# Patient Record
Sex: MALE | Race: WHITE | NOT HISPANIC OR LATINO | Employment: FULL TIME | URBAN - METROPOLITAN AREA
[De-identification: names, ages, dates, MRNs, and addresses within clinical notes are randomized per-mention and may not be internally consistent; named-entity substitution may affect disease eponyms.]

---

## 2018-06-05 DIAGNOSIS — M25.532 LEFT WRIST PAIN: Primary | ICD-10-CM

## 2018-06-05 DIAGNOSIS — M79.672 LEFT FOOT PAIN: ICD-10-CM

## 2018-06-06 ENCOUNTER — OFFICE VISIT (OUTPATIENT)
Dept: ORTHOPEDICS | Facility: CLINIC | Age: 38
End: 2018-06-06
Payer: COMMERCIAL

## 2018-06-06 ENCOUNTER — HOSPITAL ENCOUNTER (OUTPATIENT)
Dept: RADIOLOGY | Facility: HOSPITAL | Age: 38
Discharge: HOME | End: 2018-06-06
Attending: ORTHOPAEDIC SURGERY
Payer: COMMERCIAL

## 2018-06-06 VITALS — HEIGHT: 70 IN | BODY MASS INDEX: 21.47 KG/M2 | WEIGHT: 150 LBS

## 2018-06-06 DIAGNOSIS — M79.672 LEFT FOOT PAIN: ICD-10-CM

## 2018-06-06 DIAGNOSIS — M25.532 LEFT WRIST PAIN: ICD-10-CM

## 2018-06-06 DIAGNOSIS — M25.572 ACUTE LEFT ANKLE PAIN: ICD-10-CM

## 2018-06-06 DIAGNOSIS — S52.502A CLOSED FRACTURE OF DISTAL END OF LEFT RADIUS, UNSPECIFIED FRACTURE MORPHOLOGY, INITIAL ENCOUNTER: ICD-10-CM

## 2018-06-06 DIAGNOSIS — M25.532 LEFT WRIST PAIN: Primary | ICD-10-CM

## 2018-06-06 PROCEDURE — 73630 X-RAY EXAM OF FOOT: CPT | Mod: TC,PO,LT

## 2018-06-06 PROCEDURE — 73610 X-RAY EXAM OF ANKLE: CPT | Mod: 26,LT,, | Performed by: RADIOLOGY

## 2018-06-06 PROCEDURE — 99203 OFFICE O/P NEW LOW 30 MIN: CPT | Mod: 57,S$GLB,, | Performed by: ORTHOPAEDIC SURGERY

## 2018-06-06 PROCEDURE — 25600 CLTX DST RDL FX/EPHYS SEP WO: CPT | Mod: LT,S$GLB,, | Performed by: ORTHOPAEDIC SURGERY

## 2018-06-06 PROCEDURE — 3008F BODY MASS INDEX DOCD: CPT | Mod: CPTII,S$GLB,, | Performed by: ORTHOPAEDIC SURGERY

## 2018-06-06 PROCEDURE — 73110 X-RAY EXAM OF WRIST: CPT | Mod: TC,PO,LT

## 2018-06-06 PROCEDURE — 99999 PR PBB SHADOW E&M-EST. PATIENT-LVL II: CPT | Mod: PBBFAC,,, | Performed by: ORTHOPAEDIC SURGERY

## 2018-06-06 PROCEDURE — 73630 X-RAY EXAM OF FOOT: CPT | Mod: 26,LT,, | Performed by: RADIOLOGY

## 2018-06-06 PROCEDURE — 73130 X-RAY EXAM OF HAND: CPT | Mod: TC,PO,LT

## 2018-06-06 PROCEDURE — 73130 X-RAY EXAM OF HAND: CPT | Mod: 26,LT,, | Performed by: RADIOLOGY

## 2018-06-06 PROCEDURE — 73110 X-RAY EXAM OF WRIST: CPT | Mod: 26,LT,, | Performed by: RADIOLOGY

## 2018-06-06 PROCEDURE — 73610 X-RAY EXAM OF ANKLE: CPT | Mod: TC,PO,LT

## 2018-06-06 RX ORDER — OXYCODONE AND ACETAMINOPHEN 5; 325 MG/1; MG/1
5-325 TABLET ORAL EVERY 8 HOURS PRN
COMMUNITY
Start: 2018-05-31 | End: 2018-06-22 | Stop reason: ALTCHOICE

## 2018-06-06 NOTE — PROGRESS NOTES
Shelley Wagner, 37-year-old about a week ago, fell off a ladder, injured his left   wrist and left foot when he fell off a ladder.  He went to an outlying facility.    He had a closed reduction, long arm cast placed into his left wrist, comes in   today for more definitive treatment.  He rates the pain as 6/10 on the pain   scale.    PHYSICAL EXAMINATION:  Today shows a well-fitting long arm cast.  Fingertips   well perfused.  No pain with passive stretch of the fingers.  Exam of the left   foot shows some bruising and swelling in the region of the mid foot dorsally.    Flexor and extensor intact.  Compartments are soft.  X-rays of the left foot   show a foreign body in the fifth metatarsal.  Previous hardware placed in ankle,   medial and lateral malleoli, total of three screws.  X-rays left wrist show a   distal radial fracture that is comminuted with good position in AP and lateral   planes.    ASSESSMENT:  Left distal radial fracture.    PLAN:  We will continue with the long arm cast.  We will have him come back in a   couple of weeks' time with repeat x-rays of his wrist in the cast.    Additionally, he has a boot that he can use for his left foot and I will see him   back to recheck that as well.      PBB/HN  dd: 06/06/2018 11:19:39 (CDT)  td: 06/06/2018 23:18:52 (CDT)  Doc ID   #[id number]  Job ID #[id number]    CC:     Further History  Aching pain  Worse with activity  Relieved with rest  No other associated symptoms  No other radiation    Further Exam  Alert and oriented  Pleasant  Contralateral limb has appropriate range of motion for age and condition  Contralateral limb has appropriate strength for age and condition  Contralateral limb has appropriate stability  for age and condition  No adenopathy  Pulses are appropriate for current condition  Skin is intact        Chief Complaint    Chief Complaint   Patient presents with    Left Ankle - Injury, Pain    Left Wrist - Injury, Pain       HPI  Shelley Wagner is a  37 y.o.  male who presents with       Past Medical History  No past medical history on file.    Past Surgical History  No past surgical history on file.    Medications  Current Outpatient Prescriptions   Medication Sig    oxyCODONE-acetaminophen (PERCOCET) 5-325 mg per tablet Take 5-325 tablets by mouth every 8 (eight) hours as needed.      No current facility-administered medications for this visit.        Allergies  Review of patient's allergies indicates:  No Known Allergies    Family History  No family history on file.    Social History  Social History     Social History    Marital status:      Spouse name: N/A    Number of children: N/A    Years of education: N/A     Occupational History    Not on file.     Social History Main Topics    Smoking status: Not on file    Smokeless tobacco: Not on file    Alcohol use Not on file    Drug use: Unknown    Sexual activity: Not on file     Other Topics Concern    Not on file     Social History Narrative    No narrative on file               Review of Systems     Constitutional: Negative    HENT: Negative  Eyes: Negative  Respiratory: Negative  Cardiovascular: Negative  Musculoskeletal: HPI  Skin: Negative  Neurological: Negative  Hematological: Negative  Endocrine: Negative                 Physical Exam    There were no vitals filed for this visit.  Body mass index is 21.52 kg/m².  Physical Examination:     General appearance -  well appearing, and in no distress  Mental status - awake  Neck - supple  Chest -  symmetric air entry  Heart - normal rate   Abdomen - soft      Assessment     1. Left wrist pain    2. Acute left ankle pain    3. Closed fracture of distal end of left radius, unspecified fracture morphology, initial encounter          Plan

## 2018-06-19 DIAGNOSIS — M25.532 LEFT WRIST PAIN: Primary | ICD-10-CM

## 2018-06-22 ENCOUNTER — HOSPITAL ENCOUNTER (OUTPATIENT)
Dept: RADIOLOGY | Facility: HOSPITAL | Age: 38
Discharge: HOME | End: 2018-06-22
Attending: ORTHOPAEDIC SURGERY
Payer: COMMERCIAL

## 2018-06-22 ENCOUNTER — OFFICE VISIT (OUTPATIENT)
Dept: ORTHOPEDICS | Facility: CLINIC | Age: 38
End: 2018-06-22
Payer: COMMERCIAL

## 2018-06-22 VITALS — HEIGHT: 70 IN | BODY MASS INDEX: 21.47 KG/M2 | WEIGHT: 150 LBS

## 2018-06-22 DIAGNOSIS — M25.532 LEFT WRIST PAIN: ICD-10-CM

## 2018-06-22 DIAGNOSIS — Z98.890 HISTORY OF OPEN REDUCTION AND INTERNAL FIXATION (ORIF) PROCEDURE: ICD-10-CM

## 2018-06-22 DIAGNOSIS — S52.502A CLOSED FRACTURE OF DISTAL END OF LEFT RADIUS, UNSPECIFIED FRACTURE MORPHOLOGY, INITIAL ENCOUNTER: Primary | ICD-10-CM

## 2018-06-22 DIAGNOSIS — M79.672 LEFT FOOT PAIN: ICD-10-CM

## 2018-06-22 PROCEDURE — 99999 PR PBB SHADOW E&M-EST. PATIENT-LVL II: CPT | Mod: PBBFAC,,, | Performed by: ORTHOPAEDIC SURGERY

## 2018-06-22 PROCEDURE — 99024 POSTOP FOLLOW-UP VISIT: CPT | Mod: S$GLB,,, | Performed by: ORTHOPAEDIC SURGERY

## 2018-06-22 PROCEDURE — 73110 X-RAY EXAM OF WRIST: CPT | Mod: TC,PO,LT

## 2018-06-22 PROCEDURE — 73110 X-RAY EXAM OF WRIST: CPT | Mod: 26,LT,, | Performed by: RADIOLOGY

## 2018-06-22 NOTE — PROGRESS NOTES
Shelley is 37 years old.  He is about a month out from his distal radial   fracture, treated with closed reduction and casting.  He comes in today.  Pain   is decreasing.    X-rays show maintenance of position of his comminuted and displaced distal   radial fracture.    PLAN:  At this point, we will remove his long arm cast and put him into a wrist   and forearm immobilizer.  We will have him come back in about a month's time as   a postoperative visit with x-rays of his left wrist.      MARVIN/GREYSON  dd: 06/22/2018 12:05:42 (CDT)  td: 06/23/2018 02:29:59 (CDT)  Doc ID   #[id number]  Job ID #[id number]    CC:

## 2018-07-17 DIAGNOSIS — M25.532 LEFT WRIST PAIN: Primary | ICD-10-CM

## 2018-07-20 ENCOUNTER — OFFICE VISIT (OUTPATIENT)
Dept: ORTHOPEDICS | Facility: CLINIC | Age: 38
End: 2018-07-20
Payer: COMMERCIAL

## 2018-07-20 ENCOUNTER — HOSPITAL ENCOUNTER (OUTPATIENT)
Dept: RADIOLOGY | Facility: HOSPITAL | Age: 38
Discharge: HOME | End: 2018-07-20
Attending: ORTHOPAEDIC SURGERY
Payer: COMMERCIAL

## 2018-07-20 VITALS — BODY MASS INDEX: 21.47 KG/M2 | HEIGHT: 70 IN | WEIGHT: 150 LBS

## 2018-07-20 DIAGNOSIS — S52.502D CLOSED FRACTURE OF DISTAL END OF LEFT RADIUS WITH ROUTINE HEALING, UNSPECIFIED FRACTURE MORPHOLOGY, SUBSEQUENT ENCOUNTER: Primary | ICD-10-CM

## 2018-07-20 DIAGNOSIS — M25.532 LEFT WRIST PAIN: ICD-10-CM

## 2018-07-20 PROCEDURE — 99999 PR PBB SHADOW E&M-EST. PATIENT-LVL II: CPT | Mod: PBBFAC,,, | Performed by: ORTHOPAEDIC SURGERY

## 2018-07-20 PROCEDURE — 99024 POSTOP FOLLOW-UP VISIT: CPT | Mod: S$GLB,,, | Performed by: ORTHOPAEDIC SURGERY

## 2018-07-20 PROCEDURE — 73110 X-RAY EXAM OF WRIST: CPT | Mod: TC,PO,LT

## 2018-07-20 PROCEDURE — 73110 X-RAY EXAM OF WRIST: CPT | Mod: 26,LT,, | Performed by: RADIOLOGY

## 2018-07-20 NOTE — PROGRESS NOTES
Mr. Wagner is about two months out from his distal radial fracture, treated with   closed reduction and casting, which was done elsewhere.  He has some discomfort   in his wrist, still lacks full motion.  Still a little bit tender throughout.    No signs of infection.  Flexion and extension intact.  Compartments are soft.    X-rays show a healing distal radial fracture with possible disruption of the   DRUJ as well.    ASSESSMENT:  Wrist fracture.    PLAN:  We are going to wean the use of the wrist and forearm immobilizer.  Work   on gentle range of motion, light activity, light strengthening.  Follow up in   about six weeks' time with repeat x-rays of his wrist.      MARVIN/GREYSON  dd: 07/20/2018 11:37:29 (CDT)  td: 07/21/2018 05:05:18 (CDT)  Doc ID   #[id number]  Job ID #[id number]    CC:

## 2018-08-28 DIAGNOSIS — S52.502S CLOSED FRACTURE OF DISTAL END OF LEFT RADIUS, UNSPECIFIED FRACTURE MORPHOLOGY, SEQUELA: Primary | ICD-10-CM

## 2018-08-31 ENCOUNTER — HOSPITAL ENCOUNTER (OUTPATIENT)
Dept: RADIOLOGY | Facility: HOSPITAL | Age: 38
Discharge: HOME | End: 2018-08-31
Attending: ORTHOPAEDIC SURGERY
Payer: COMMERCIAL

## 2018-08-31 ENCOUNTER — OFFICE VISIT (OUTPATIENT)
Dept: ORTHOPEDICS | Facility: CLINIC | Age: 38
End: 2018-08-31
Payer: COMMERCIAL

## 2018-08-31 VITALS — HEIGHT: 70 IN | WEIGHT: 150 LBS | BODY MASS INDEX: 21.47 KG/M2

## 2018-08-31 DIAGNOSIS — S52.502S CLOSED FRACTURE OF DISTAL END OF LEFT RADIUS, UNSPECIFIED FRACTURE MORPHOLOGY, SEQUELA: Primary | ICD-10-CM

## 2018-08-31 DIAGNOSIS — S52.502S CLOSED FRACTURE OF DISTAL END OF LEFT RADIUS, UNSPECIFIED FRACTURE MORPHOLOGY, SEQUELA: ICD-10-CM

## 2018-08-31 DIAGNOSIS — S93.409S SPRAIN OF ANKLE, UNSPECIFIED LATERALITY, UNSPECIFIED LIGAMENT, SEQUELA: ICD-10-CM

## 2018-08-31 PROCEDURE — 73110 X-RAY EXAM OF WRIST: CPT | Mod: 26,LT,, | Performed by: RADIOLOGY

## 2018-08-31 PROCEDURE — 3008F BODY MASS INDEX DOCD: CPT | Mod: CPTII,S$GLB,, | Performed by: ORTHOPAEDIC SURGERY

## 2018-08-31 PROCEDURE — 99024 POSTOP FOLLOW-UP VISIT: CPT | Mod: S$GLB,,, | Performed by: ORTHOPAEDIC SURGERY

## 2018-08-31 PROCEDURE — 99999 PR PBB SHADOW E&M-EST. PATIENT-LVL III: CPT | Mod: PBBFAC,,, | Performed by: ORTHOPAEDIC SURGERY

## 2018-08-31 PROCEDURE — 73110 X-RAY EXAM OF WRIST: CPT | Mod: TC,PO,LT

## 2018-08-31 NOTE — PROGRESS NOTES
38 years old.  He is now over three months out from his distal radial fracture.    He feels like he is improving.  His x-rays show good healing of the bone with   persistent malunion and DRUJ disruption.    PLAN:  At this point, we are going to get him set up with therapy to work on   regaining motion and function.  He is aware of the fact that he will probably   have limitations with this injury, but hopefully be able to function at a high   level.  We will check him back here in several weeks' time after therapy and see   how things are coming along.      MARVIN/GREYSON  dd: 08/31/2018 12:30:53 (CDT)  td: 09/01/2018 07:06:04 (CDT)  Doc ID   #[id number]  Job ID #[id number]    CC:

## 2018-09-10 ENCOUNTER — TELEPHONE (OUTPATIENT)
Dept: ORTHOPEDICS | Facility: CLINIC | Age: 38
End: 2018-09-10

## 2018-09-10 NOTE — TELEPHONE ENCOUNTER
----- Message from Carlito Sloan sent at 9/10/2018  2:45 PM CDT -----  Type: Needs Medical Advice    Who Called:  Bernardo/Basil SIMMS  Best Call Back Number: [phone]  Additional Information: Need OP Report

## 2018-10-19 ENCOUNTER — OFFICE VISIT (OUTPATIENT)
Dept: ORTHOPEDICS | Facility: CLINIC | Age: 38
End: 2018-10-19
Payer: COMMERCIAL

## 2018-10-19 VITALS — HEIGHT: 70 IN | WEIGHT: 150 LBS | BODY MASS INDEX: 21.47 KG/M2

## 2018-10-19 DIAGNOSIS — M25.532 LEFT WRIST PAIN: ICD-10-CM
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
## 2018-10-19 DIAGNOSIS — S69.92XD LEFT WRIST INJURY, SUBSEQUENT ENCOUNTER: ICD-10-CM

## 2018-10-19 DIAGNOSIS — S52.502D CLOSED FRACTURE OF DISTAL END OF LEFT RADIUS WITH ROUTINE HEALING, UNSPECIFIED FRACTURE MORPHOLOGY, SUBSEQUENT ENCOUNTER: Primary | ICD-10-CM

## 2018-10-19 PROCEDURE — 3008F BODY MASS INDEX DOCD: CPT | Mod: CPTII,S$GLB,, | Performed by: ORTHOPAEDIC SURGERY

## 2018-10-19 PROCEDURE — 99213 OFFICE O/P EST LOW 20 MIN: CPT | Mod: S$GLB,,, | Performed by: ORTHOPAEDIC SURGERY

## 2018-10-19 PROCEDURE — 99999 PR PBB SHADOW E&M-EST. PATIENT-LVL II: CPT | Mod: PBBFAC,,, | Performed by: ORTHOPAEDIC SURGERY

## 2018-10-19 NOTE — PROGRESS NOTES
This is a 38 years old, about five months out from his injury, initially treated   elsewhere with closed reduction.  He has malunion and ____ symptomatic for him.    He is interested in discussing different options for his injury with a   specialist.  We will see if he can get him again point in that direction.      MARVIN/GREYSON  dd: 10/19/2018 11:30:25 (CDT)  td: 10/20/2018 01:33:26 (CDT)  Doc ID   #[id number]  Job ID #[id number]    CC:

## 2018-10-22 DIAGNOSIS — M25.572 LEFT ANKLE PAIN, UNSPECIFIED CHRONICITY: Primary | ICD-10-CM

## 2018-10-23 ENCOUNTER — OFFICE VISIT (OUTPATIENT)
Dept: ORTHOPEDICS | Facility: CLINIC | Age: 38
End: 2018-10-23
Payer: COMMERCIAL

## 2018-10-23 ENCOUNTER — HOSPITAL ENCOUNTER (OUTPATIENT)
Dept: RADIOLOGY | Facility: HOSPITAL | Age: 38
Discharge: HOME | End: 2018-10-23
Attending: ORTHOPAEDIC SURGERY
Payer: COMMERCIAL

## 2018-10-23 VITALS
DIASTOLIC BLOOD PRESSURE: 86 MMHG | HEART RATE: 62 BPM | WEIGHT: 150 LBS | BODY MASS INDEX: 21.47 KG/M2 | SYSTOLIC BLOOD PRESSURE: 156 MMHG | HEIGHT: 70 IN

## 2018-10-23 DIAGNOSIS — M25.572 LEFT ANKLE PAIN, UNSPECIFIED CHRONICITY: ICD-10-CM

## 2018-10-23 DIAGNOSIS — M25.572 LEFT ANKLE PAIN, UNSPECIFIED CHRONICITY: Primary | ICD-10-CM

## 2018-10-23 DIAGNOSIS — M76.822 POSTERIOR TIBIAL TENDON DYSFUNCTION (PTTD) OF LEFT LOWER EXTREMITY: ICD-10-CM

## 2018-10-23 PROCEDURE — 99244 OFF/OP CNSLTJ NEW/EST MOD 40: CPT | Mod: S$GLB,,, | Performed by: ORTHOPAEDIC SURGERY

## 2018-10-23 PROCEDURE — 73610 X-RAY EXAM OF ANKLE: CPT | Mod: 26,LT,, | Performed by: RADIOLOGY

## 2018-10-23 PROCEDURE — 99999 PR PBB SHADOW E&M-EST. PATIENT-LVL III: CPT | Mod: PBBFAC,,, | Performed by: ORTHOPAEDIC SURGERY

## 2018-10-23 PROCEDURE — 73610 X-RAY EXAM OF ANKLE: CPT | Mod: TC,PO,LT

## 2018-10-23 NOTE — LETTER
October 23, 2018      Cliff Mack MD  [address] [facility] [address] [place] [address]           [facility]  [address] [facility] [address] [place] [address]  Phone: [phone]          Patient: Shelley Wagner   MR Number: [id number]   YOB: 1980   Date of Visit: 10/23/2018       Dear Dr. Cliff Mack:    Thank you for referring Shelley Wagner to me for evaluation. Attached you will find relevant portions of my assessment and plan of care.    If you have questions, please do not hesitate to call me. I look forward to following Shelley Wagner along with you.    Sincerely,    Tino Zarco MD    Enclosure  CC:  No Recipients    If you would like to receive this communication electronically, please contact [email or web address] or [phone] to request more information on [facility] access.    For providers and/or their staff who would like to refer a patient to [facility], please contact us through our one-stop-shop provider referral line, [facility] , at [phone].    If you feel you have received this communication in error or would no longer like to receive these types of communications, please e-mail [email or web address]
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
## 2018-10-30 ENCOUNTER — HOSPITAL ENCOUNTER (OUTPATIENT)
Dept: RADIOLOGY | Facility: HOSPITAL | Age: 38
Discharge: HOME | End: 2018-10-30
Attending: ORTHOPAEDIC SURGERY
Payer: COMMERCIAL

## 2018-10-30 DIAGNOSIS — M25.572 LEFT ANKLE PAIN, UNSPECIFIED CHRONICITY: ICD-10-CM

## 2018-10-30 PROCEDURE — 73721 MRI JNT OF LWR EXTRE W/O DYE: CPT | Mod: 26,LT,, | Performed by: RADIOLOGY

## 2018-10-30 PROCEDURE — 73721 MRI JNT OF LWR EXTRE W/O DYE: CPT | Mod: TC,LT

## 2018-10-31 ENCOUNTER — TELEPHONE (OUTPATIENT)
Dept: ORTHOPEDICS | Facility: CLINIC | Age: 38
End: 2018-10-31

## 2018-11-02 ENCOUNTER — TELEPHONE (OUTPATIENT)
Dept: ORTHOPEDICS | Facility: CLINIC | Age: 38
End: 2018-11-02

## 2018-11-02 NOTE — TELEPHONE ENCOUNTER
CALLED PATIENT TO RESCHEDULE APPOINTMENT  WILL NOT BE IN DUE TO ILLNESS PATIENT PHONE WAS UNAVAILABLE COULD NOT LEAVE MESSAGE

## 2018-11-02 NOTE — TELEPHONE ENCOUNTER
SPOKE TO PATIENT AND RESCHEDULED HIS APPOINTMENT UNTIL Monday, I ALSO LET PATIENT KNOW HE SHOULD REMAIN NON WEIGHT BEARING UNTIL THEN DUE TO HIS FRACTURE. PATIENT VERBALIZES UNDERSTANDING

## 2018-11-02 NOTE — TELEPHONE ENCOUNTER
Lm for patient's wife as his number is unavailable. Will need to speak to patient regarding test results and to reschedule his appt.

## 2018-11-05 ENCOUNTER — OFFICE VISIT (OUTPATIENT)
Dept: ORTHOPEDICS | Facility: CLINIC | Age: 38
End: 2018-11-05
Payer: COMMERCIAL

## 2018-11-05 VITALS
DIASTOLIC BLOOD PRESSURE: 99 MMHG | HEIGHT: 70 IN | WEIGHT: 149.94 LBS | BODY MASS INDEX: 21.47 KG/M2 | SYSTOLIC BLOOD PRESSURE: 145 MMHG | HEART RATE: 63 BPM

## 2018-11-05 DIAGNOSIS — S92.015G: ICD-10-CM

## 2018-11-05 DIAGNOSIS — M76.822 POSTERIOR TIBIAL TENDON DYSFUNCTION (PTTD) OF LEFT LOWER EXTREMITY: Primary | ICD-10-CM

## 2018-11-05 DIAGNOSIS — Z72.0 SMOKING TRYING TO QUIT: Primary | ICD-10-CM

## 2018-11-05 PROCEDURE — 3008F BODY MASS INDEX DOCD: CPT | Mod: CPTII,S$GLB,, | Performed by: ORTHOPAEDIC SURGERY

## 2018-11-05 PROCEDURE — 29405 APPL SHORT LEG CAST: CPT | Mod: LT,S$GLB,, | Performed by: ORTHOPAEDIC SURGERY

## 2018-11-05 PROCEDURE — 99214 OFFICE O/P EST MOD 30 MIN: CPT | Mod: 25,S$GLB,, | Performed by: ORTHOPAEDIC SURGERY

## 2018-11-05 PROCEDURE — 99999 PR PBB SHADOW E&M-EST. PATIENT-LVL III: CPT | Mod: PBBFAC,,, | Performed by: ORTHOPAEDIC SURGERY

## 2018-11-05 PROCEDURE — 99406 BEHAV CHNG SMOKING 3-10 MIN: CPT | Mod: S$GLB,,, | Performed by: ORTHOPAEDIC SURGERY

## 2018-11-05 RX ORDER — VARENICLINE TARTRATE 0.5 MG/1
0.5 TABLET, FILM COATED ORAL 2 TIMES DAILY
Qty: 60 TABLET | Refills: 0 | Status: SHIPPED | OUTPATIENT
Start: 2018-11-05 | End: 2018-12-05

## 2018-11-05 RX ORDER — METHADONE HYDROCHLORIDE 40 MG/1
90 TABLET ORAL DAILY
COMMUNITY

## 2018-11-05 RX ORDER — VARENICLINE TARTRATE 0.5 (11)-1
KIT ORAL
Qty: 1 PACKAGE | Refills: 0 | Status: SHIPPED | OUTPATIENT
Start: 2018-11-05 | End: 2019-06-13

## 2018-11-05 RX ORDER — MELOXICAM 15 MG/1
15 TABLET ORAL DAILY
COMMUNITY
Start: 2018-11-02 | End: 2018-12-21
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
## 2018-11-14 ENCOUNTER — TELEPHONE (OUTPATIENT)
Dept: ORTHOPEDICS | Facility: CLINIC | Age: 38
End: 2018-11-14

## 2018-11-28 DIAGNOSIS — S92.015G: Primary | ICD-10-CM

## 2018-11-30 ENCOUNTER — OFFICE VISIT (OUTPATIENT)
Dept: ORTHOPEDICS | Facility: CLINIC | Age: 38
End: 2018-11-30
Payer: COMMERCIAL

## 2018-11-30 ENCOUNTER — HOSPITAL ENCOUNTER (OUTPATIENT)
Dept: RADIOLOGY | Facility: HOSPITAL | Age: 38
Discharge: HOME | End: 2018-11-30
Attending: ORTHOPAEDIC SURGERY
Payer: COMMERCIAL

## 2018-11-30 VITALS
WEIGHT: 149.94 LBS | SYSTOLIC BLOOD PRESSURE: 141 MMHG | BODY MASS INDEX: 21.47 KG/M2 | HEIGHT: 70 IN | HEART RATE: 91 BPM | DIASTOLIC BLOOD PRESSURE: 97 MMHG

## 2018-11-30 DIAGNOSIS — S92.015G: ICD-10-CM

## 2018-11-30 DIAGNOSIS — S92.015K: Primary | ICD-10-CM

## 2018-11-30 PROCEDURE — 99999 PR PBB SHADOW E&M-EST. PATIENT-LVL III: CPT | Mod: PBBFAC,,, | Performed by: ORTHOPAEDIC SURGERY

## 2018-11-30 PROCEDURE — 73650 X-RAY EXAM OF HEEL: CPT | Mod: 26,LT,, | Performed by: RADIOLOGY

## 2018-11-30 PROCEDURE — 29405 APPL SHORT LEG CAST: CPT | Mod: LT,S$GLB,, | Performed by: ORTHOPAEDIC SURGERY

## 2018-11-30 PROCEDURE — 99214 OFFICE O/P EST MOD 30 MIN: CPT | Mod: 25,S$GLB,, | Performed by: ORTHOPAEDIC SURGERY

## 2018-11-30 PROCEDURE — 3008F BODY MASS INDEX DOCD: CPT | Mod: CPTII,S$GLB,, | Performed by: ORTHOPAEDIC SURGERY

## 2018-11-30 PROCEDURE — 73650 X-RAY EXAM OF HEEL: CPT | Mod: TC,PO,LT

## 2018-12-04 PROBLEM — S92.015K: Status: ACTIVE | Noted: 2018-11-05

## 2018-12-04 NOTE — PROGRESS NOTES
"HPI: Shelley Wagner is a  38 y.o. male who is here for f/u on his left calcaneus fracture. The pain began over 4 months ago when fell onto that foot.  He rates his pain as 2/10 today. He says he has cut back to 4 cigarettes a day.     PAST MEDICAL/SURGICAL/FAMILY/SOCIAL/ HISTORY: REVIEWED  + smokes 1 ppd of cigarettes    ALLERGIES/MEDICATIONS: REVIEWED     PHYSICAL EXAM:  Vitals:    11/30/18 1011   BP: (!) 141/97   Pulse: 91     Ht Readings from Last 1 Encounters:   11/30/18 5' 10" (1.778 m)     Wt Readings from Last 1 Encounters:   11/30/18 68 kg (149 lb 14.6 oz)       GENERAL: Well developed, well nourished, no acute distress.    Left lower extremity:  2+ dorsalis pedis pulse.  Capillary refill < 3 seconds.  Decreased range of motion tibiotalar and subtalar joints. Mild swelling, no ecchymosis or deformity. No lymphadenopathy, no masses or tumors palpated  tenderness to palpation body of the calcaneus.       XRAYS: 2 views of left calcaneus obtained and reviewed today reveal there is vague curvilinear sclerosis present within the left calcaneus compatible with a non-union of the fracture of the left calcaneus.     ASSESSMENT:           Encounter Diagnosis   Name Primary?   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 Non-weightbearing.  F/u 3 weeks with xray of the left calcaneus.      "

## 2018-12-19 DIAGNOSIS — M79.672 LEFT FOOT PAIN: Primary | ICD-10-CM

## 2018-12-21 ENCOUNTER — OFFICE VISIT (OUTPATIENT)
Dept: ORTHOPEDICS | Facility: CLINIC | Age: 38
End: 2018-12-21
Payer: COMMERCIAL

## 2018-12-21 ENCOUNTER — HOSPITAL ENCOUNTER (OUTPATIENT)
Dept: RADIOLOGY | Facility: HOSPITAL | Age: 38
Discharge: HOME | End: 2018-12-21
Attending: ORTHOPAEDIC SURGERY
Payer: COMMERCIAL

## 2018-12-21 VITALS
WEIGHT: 149.94 LBS | HEART RATE: 87 BPM | BODY MASS INDEX: 21.47 KG/M2 | DIASTOLIC BLOOD PRESSURE: 97 MMHG | HEIGHT: 70 IN | SYSTOLIC BLOOD PRESSURE: 134 MMHG

## 2018-12-21 DIAGNOSIS — S92.015K: Primary | ICD-10-CM

## 2018-12-21 DIAGNOSIS — M79.672 LEFT FOOT PAIN: ICD-10-CM

## 2018-12-21 PROCEDURE — 73650 X-RAY EXAM OF HEEL: CPT | Mod: TC,PO,LT

## 2018-12-21 PROCEDURE — 99024 POSTOP FOLLOW-UP VISIT: CPT | Mod: S$GLB,,, | Performed by: ORTHOPAEDIC SURGERY

## 2018-12-21 PROCEDURE — 99999 PR PBB SHADOW E&M-EST. PATIENT-LVL III: CPT | Mod: PBBFAC,,, | Performed by: ORTHOPAEDIC SURGERY

## 2018-12-21 PROCEDURE — 73650 X-RAY EXAM OF HEEL: CPT | Mod: 26,LT,, | Performed by: RADIOLOGY

## 2018-12-21 NOTE — PROGRESS NOTES
"HPI: Shelley Wagner is a  38 y.o. male who is here for f/u on his left calcaneus fracture.  He rates his pain as 3/10 today. He says he is still smoking 5 cigarettes a day.     PAST MEDICAL/SURGICAL/FAMILY/SOCIAL/ HISTORY: REVIEWED  + smokes 1 ppd of cigarettes    ALLERGIES/MEDICATIONS: REVIEWED     PHYSICAL EXAM:  Vitals:    12/21/18 1034   BP: (!) 134/97   Pulse: 87     Ht Readings from Last 1 Encounters:   12/21/18 5' 10" (1.778 m)     Wt Readings from Last 1 Encounters:   12/21/18 68 kg (149 lb 14.6 oz)       GENERAL: Well developed, well nourished, no acute distress.    Left lower extremity:  2+ dorsalis pedis pulse.  Capillary refill < 3 seconds.  Decreased range of motion tibiotalar and subtalar joints. Minimal swelling. Minimal tenderness to palpation body of the calcaneus.       XRAYS: 2 views of left calcaneus obtained and reviewed today reveal there is vague curvilinear sclerosis present within the left calcaneus compatible with a non-union of the fracture of the left calcaneus.     ASSESSMENT:           Encounter Diagnosis   Name Primary?   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 The patient demonstrated understanding and proper care. This was performed for 15 minutes.  Short boot  was given. Continue bone stimulator. Weight bearing as tolerated.      F/u 3 weeks with xray of the left calcaneus.      "

## 2019-01-14 ENCOUNTER — OFFICE VISIT (OUTPATIENT)
Dept: ORTHOPEDICS | Facility: CLINIC | Age: 39
End: 2019-01-14
Payer: COMMERCIAL

## 2019-01-14 ENCOUNTER — HOSPITAL ENCOUNTER (OUTPATIENT)
Dept: RADIOLOGY | Facility: HOSPITAL | Age: 39
Discharge: HOME | End: 2019-01-14
Attending: ORTHOPAEDIC SURGERY
Payer: COMMERCIAL

## 2019-01-14 VITALS
HEART RATE: 97 BPM | HEIGHT: 70 IN | SYSTOLIC BLOOD PRESSURE: 138 MMHG | DIASTOLIC BLOOD PRESSURE: 93 MMHG | BODY MASS INDEX: 21.47 KG/M2 | WEIGHT: 149.94 LBS

## 2019-01-14 DIAGNOSIS — S92.015K: ICD-10-CM

## 2019-01-14 DIAGNOSIS — S92.015K: Primary | ICD-10-CM

## 2019-01-14 PROCEDURE — 73650 X-RAY EXAM OF HEEL: CPT | Mod: 26,LT,, | Performed by: RADIOLOGY

## 2019-01-14 PROCEDURE — 3008F PR BODY MASS INDEX (BMI) DOCUMENTED: ICD-10-PCS | Mod: CPTII,S$GLB,, | Performed by: ORTHOPAEDIC SURGERY

## 2019-01-14 PROCEDURE — 73650 XR CALCANEUS 2 VIEW LEFT: ICD-10-PCS | Mod: 26,LT,, | Performed by: RADIOLOGY

## 2019-01-14 PROCEDURE — 99999 PR PBB SHADOW E&M-EST. PATIENT-LVL III: CPT | Mod: PBBFAC,,, | Performed by: ORTHOPAEDIC SURGERY

## 2019-01-14 PROCEDURE — 73650 X-RAY EXAM OF HEEL: CPT | Mod: TC,PO,LT

## 2019-01-14 PROCEDURE — 99214 OFFICE O/P EST MOD 30 MIN: CPT | Mod: S$GLB,,, | Performed by: ORTHOPAEDIC SURGERY

## 2019-01-14 PROCEDURE — 99999 PR PBB SHADOW E&M-EST. PATIENT-LVL III: ICD-10-PCS | Mod: PBBFAC,,, | Performed by: ORTHOPAEDIC SURGERY

## 2019-01-14 PROCEDURE — 3008F BODY MASS INDEX DOCD: CPT | Mod: CPTII,S$GLB,, | Performed by: ORTHOPAEDIC SURGERY

## 2019-01-14 PROCEDURE — 99214 PR OFFICE/OUTPT VISIT, EST, LEVL IV, 30-39 MIN: ICD-10-PCS | Mod: S$GLB,,, | Performed by: ORTHOPAEDIC SURGERY

## 2019-02-04 ENCOUNTER — HOSPITAL ENCOUNTER (OUTPATIENT)
Dept: RADIOLOGY | Facility: HOSPITAL | Age: 39
Discharge: HOME | End: 2019-02-04
Attending: ORTHOPAEDIC SURGERY
Payer: COMMERCIAL

## 2019-02-04 ENCOUNTER — OFFICE VISIT (OUTPATIENT)
Dept: ORTHOPEDICS | Facility: CLINIC | Age: 39
End: 2019-02-04
Payer: COMMERCIAL

## 2019-02-04 VITALS
HEIGHT: 70 IN | BODY MASS INDEX: 21.47 KG/M2 | SYSTOLIC BLOOD PRESSURE: 134 MMHG | DIASTOLIC BLOOD PRESSURE: 95 MMHG | HEART RATE: 88 BPM | WEIGHT: 149.94 LBS

## 2019-02-04 DIAGNOSIS — S92.015K: ICD-10-CM

## 2019-02-04 DIAGNOSIS — S92.015K: Primary | ICD-10-CM

## 2019-02-04 PROCEDURE — 99999 PR PBB SHADOW E&M-EST. PATIENT-LVL III: CPT | Mod: PBBFAC,,, | Performed by: ORTHOPAEDIC SURGERY

## 2019-02-04 PROCEDURE — 73650 XR CALCANEUS 2 VIEW LEFT: ICD-10-PCS | Mod: 26,LT,, | Performed by: RADIOLOGY

## 2019-02-04 PROCEDURE — 3008F BODY MASS INDEX DOCD: CPT | Mod: CPTII,S$GLB,, | Performed by: ORTHOPAEDIC SURGERY

## 2019-02-04 PROCEDURE — 99999 PR PBB SHADOW E&M-EST. PATIENT-LVL III: ICD-10-PCS | Mod: PBBFAC,,, | Performed by: ORTHOPAEDIC SURGERY

## 2019-02-04 PROCEDURE — 73650 X-RAY EXAM OF HEEL: CPT | Mod: 26,LT,, | Performed by: RADIOLOGY

## 2019-02-04 PROCEDURE — 73650 X-RAY EXAM OF HEEL: CPT | Mod: TC,PO,LT

## 2019-02-04 PROCEDURE — 99214 PR OFFICE/OUTPT VISIT, EST, LEVL IV, 30-39 MIN: ICD-10-PCS | Mod: S$GLB,,, | Performed by: ORTHOPAEDIC SURGERY

## 2019-02-04 PROCEDURE — 99214 OFFICE O/P EST MOD 30 MIN: CPT | Mod: S$GLB,,, | Performed by: ORTHOPAEDIC SURGERY

## 2019-02-04 PROCEDURE — 3008F PR BODY MASS INDEX (BMI) DOCUMENTED: ICD-10-PCS | Mod: CPTII,S$GLB,, | Performed by: ORTHOPAEDIC SURGERY

## 2019-02-04 NOTE — PROGRESS NOTES
"HPI: Shelley Wagner is a  38 y.o. male who is here for f/u on his left calcaneus fracture.  He rates his pain as 3/10 today. He did not start PT due to family issues. He is still in the boot.     PHYSICAL EXAM:  Vitals:    02/04/19 1355   BP: (!) 134/95   Pulse: 88     Ht Readings from Last 1 Encounters:   02/04/19 5' 10" (1.778 m)     Wt Readings from Last 1 Encounters:   02/04/19 68 kg (149 lb 14.6 oz)       GENERAL: Well developed, well nourished, no acute distress.    Left lower extremity:  2+ dorsalis pedis pulse.  Capillary refill < 3 seconds.  Decreased range of motion tibiotalar and subtalar joints. Minimal swelling. Minimal tenderness to palpation body of the calcaneus.       XRAYS: 2 views of left calcaneus obtained and reviewed today reveal there is vague curvilinear sclerosis . There is interval progression of healing.     ASSESSMENT:       Left calcaneus fracture    PLAN:   I re-ordered his PT 2/6.  I recommend Weight bearing as tolerated in a tennis shoe. F/u 6 weeks with xray of the left calcaneus.      "

## 2019-03-15 DIAGNOSIS — S92.015K: Primary | ICD-10-CM

## 2019-03-18 ENCOUNTER — OFFICE VISIT (OUTPATIENT)
Dept: ORTHOPEDICS | Facility: CLINIC | Age: 39
End: 2019-03-18
Payer: COMMERCIAL

## 2019-03-18 ENCOUNTER — HOSPITAL ENCOUNTER (OUTPATIENT)
Dept: RADIOLOGY | Facility: HOSPITAL | Age: 39
Discharge: HOME | End: 2019-03-18
Attending: ORTHOPAEDIC SURGERY
Payer: COMMERCIAL

## 2019-03-18 VITALS
DIASTOLIC BLOOD PRESSURE: 82 MMHG | WEIGHT: 149 LBS | HEIGHT: 70 IN | SYSTOLIC BLOOD PRESSURE: 129 MMHG | BODY MASS INDEX: 21.33 KG/M2 | HEART RATE: 60 BPM

## 2019-03-18 DIAGNOSIS — S92.015K: ICD-10-CM

## 2019-03-18 DIAGNOSIS — S92.015K: Primary | ICD-10-CM

## 2019-03-18 PROBLEM — M76.822 POSTERIOR TIBIAL TENDON DYSFUNCTION (PTTD) OF LEFT LOWER EXTREMITY: Status: RESOLVED | Noted: 2018-10-23 | Resolved: 2019-03-18

## 2019-03-18 PROCEDURE — 73650 X-RAY EXAM OF HEEL: CPT | Mod: TC,PO,LT

## 2019-03-18 PROCEDURE — 99999 PR PBB SHADOW E&M-EST. PATIENT-LVL III: ICD-10-PCS | Mod: PBBFAC,,, | Performed by: ORTHOPAEDIC SURGERY

## 2019-03-18 PROCEDURE — 73650 XR CALCANEUS 2 VIEW LEFT: ICD-10-PCS | Mod: 26,LT,, | Performed by: RADIOLOGY

## 2019-03-18 PROCEDURE — 99213 PR OFFICE/OUTPT VISIT, EST, LEVL III, 20-29 MIN: ICD-10-PCS | Mod: S$GLB,,, | Performed by: ORTHOPAEDIC SURGERY

## 2019-03-18 PROCEDURE — 99999 PR PBB SHADOW E&M-EST. PATIENT-LVL III: CPT | Mod: PBBFAC,,, | Performed by: ORTHOPAEDIC SURGERY

## 2019-03-18 PROCEDURE — 3008F PR BODY MASS INDEX (BMI) DOCUMENTED: ICD-10-PCS | Mod: CPTII,S$GLB,, | Performed by: ORTHOPAEDIC SURGERY

## 2019-03-18 PROCEDURE — 3008F BODY MASS INDEX DOCD: CPT | Mod: CPTII,S$GLB,, | Performed by: ORTHOPAEDIC SURGERY

## 2019-03-18 PROCEDURE — 99213 OFFICE O/P EST LOW 20 MIN: CPT | Mod: S$GLB,,, | Performed by: ORTHOPAEDIC SURGERY

## 2019-03-18 PROCEDURE — 73650 X-RAY EXAM OF HEEL: CPT | Mod: 26,LT,, | Performed by: RADIOLOGY

## 2019-03-18 NOTE — PROGRESS NOTES
"HPI: Shelley Wagner is a  38 y.o. male who is here for f/u on his left calcaneus fracture.  He rates his pain as 4/10 today. He did not start PT but he did go back to work and he says it is much improved.   PHYSICAL EXAM:  Vitals:    03/18/19 1355   BP: 129/82   Pulse: 60     Ht Readings from Last 1 Encounters:   03/18/19 5' 10" (1.778 m)     Wt Readings from Last 1 Encounters:   03/18/19 67.6 kg (149 lb)       GENERAL: Well developed, well nourished, no acute distress.    Left lower extremity:  2+ dorsalis pedis pulse.  Capillary refill < 3 seconds.  Decreased range of motion subtalar joint. Minimal swelling. No tenderness to palpation body of the calcaneus.       XRAYS: 2 views of left calcaneus obtained and reviewed today reveal  There is interval progression of healing.     ASSESSMENT:       Left calcaneus fracture    PLAN:   F/u prn.      "

## 2019-03-25 ENCOUNTER — TELEPHONE (OUTPATIENT)
Dept: ORTHOPEDICS | Facility: CLINIC | Age: 39
End: 2019-03-25

## 2019-06-13 ENCOUNTER — OFFICE VISIT (OUTPATIENT)
Dept: FAMILY MEDICINE | Facility: CLINIC | Age: 39
End: 2019-06-13
Payer: COMMERCIAL

## 2019-06-13 VITALS
BODY MASS INDEX: 20.36 KG/M2 | OXYGEN SATURATION: 98 % | SYSTOLIC BLOOD PRESSURE: 120 MMHG | TEMPERATURE: 98 F | HEIGHT: 70 IN | DIASTOLIC BLOOD PRESSURE: 80 MMHG | RESPIRATION RATE: 16 BRPM | HEART RATE: 56 BPM | WEIGHT: 142.19 LBS

## 2019-06-13 DIAGNOSIS — Z13.6 ENCOUNTER FOR LIPID SCREENING FOR CARDIOVASCULAR DISEASE: ICD-10-CM

## 2019-06-13 DIAGNOSIS — M25.532 LEFT WRIST PAIN: ICD-10-CM

## 2019-06-13 DIAGNOSIS — Z13.220 ENCOUNTER FOR LIPID SCREENING FOR CARDIOVASCULAR DISEASE: ICD-10-CM

## 2019-06-13 DIAGNOSIS — R74.01 TRANSAMINITIS: ICD-10-CM

## 2019-06-13 DIAGNOSIS — R11.0 NAUSEA: Primary | ICD-10-CM

## 2019-06-13 DIAGNOSIS — Z13.1 DIABETES MELLITUS SCREENING: ICD-10-CM

## 2019-06-13 LAB
ALBUMIN SERPL-MCNC: 4.3 G/DL (ref 3.1–4.7)
ALP SERPL-CCNC: 65 IU/L (ref 40–104)
ALT (SGPT): 19 IU/L (ref 3–33)
AST SERPL-CCNC: 24 IU/L (ref 10–40)
BILIRUB SERPL-MCNC: 0.8 MG/DL (ref 0.3–1)
BUN SERPL-MCNC: 14 MG/DL (ref 8–20)
CALCIUM SERPL-MCNC: 9.5 MG/DL (ref 7.7–10.4)
CHLORIDE: 105 MMOL/L (ref 98–110)
CO2 SERPL-SCNC: 30 MMOL/L (ref 22.8–31.6)
CREATININE: 1 MG/DL (ref 0.6–1.4)
GLUCOSE: 103 MG/DL (ref 70–99)
POTASSIUM SERPL-SCNC: 4.4 MMOL/L (ref 3.5–5)
PROT SERPL-MCNC: 7 G/DL (ref 6–8.2)
SODIUM: 140 MMOL/L (ref 134–144)

## 2019-06-13 PROCEDURE — 99204 OFFICE O/P NEW MOD 45 MIN: CPT | Mod: ,,, | Performed by: FAMILY MEDICINE

## 2019-06-13 PROCEDURE — 3008F PR BODY MASS INDEX (BMI) DOCUMENTED: ICD-10-PCS | Mod: ,,, | Performed by: FAMILY MEDICINE

## 2019-06-13 PROCEDURE — 99204 PR OFFICE/OUTPT VISIT, NEW, LEVL IV, 45-59 MIN: ICD-10-PCS | Mod: ,,, | Performed by: FAMILY MEDICINE

## 2019-06-13 PROCEDURE — 3008F BODY MASS INDEX DOCD: CPT | Mod: ,,, | Performed by: FAMILY MEDICINE

## 2019-06-13 RX ORDER — ONDANSETRON HYDROCHLORIDE 8 MG/1
8 TABLET, FILM COATED ORAL EVERY 8 HOURS PRN
Qty: 6 TABLET | Refills: 0 | Status: SHIPPED | OUTPATIENT
Start: 2019-06-13

## 2019-06-13 RX ORDER — CALCIUM CARBONATE 500(1250)
1 TABLET ORAL DAILY
COMMUNITY

## 2019-06-13 NOTE — PROGRESS NOTES
SUBJECTIVE:    Patient ID: Shelley Wagner is a 38 y.o. male.    Chief Complaint: Diabetes  37 yo male presents to clinic to establish care, I have reviewed his EMR pt does not appear to have any PCM notes in his EMR. In his inpatient record pat had evidence of an elevated blood glucose and transaminitis. Pt is on chronic methadone x 4 years provided by a clinic in [place]. Pt states that the methadone clinic provides annual labs, and for the past 2 years his blood sugar has been elevated and his liver enzymes.    Pat also suffers from chronic Nausea, for the past several months, no changes in his diet, he wakes up nauseated and vomits sometimes daily. Has chronic constipation. He is a chronic Marijuana smoker (daily).    Chronic medical conditions:   Chronic heal pain from a fractured calcaneous   Chronic wrist pain from Comminuted intra-articular fracture of the distal left radius.    Other Providers: [facility]    Wrist pain  right handed male who fell off a 12 foot ladder and landed on his left arm May 2018. Pt suffered from a fx comminuted intra-articular fracture of the distal left radius. Pt was casted and has PT/OT, he never underwent surgery. Pt continues to have chronic pain in the wrist with flexion and extension, he also has limited ROM with flexion/extension and rotation.    Wrist Pain    The pain is present in the left wrist. This is a chronic problem. The current episode started more than 1 year ago. The problem occurs constantly. The problem has been unchanged. The quality of the pain is described as aching. The pain is at a severity of 4/10. The pain is moderate. Associated symptoms include a limited range of motion and stiffness. Pertinent negatives include no fever, inability to bear weight, itching, joint locking, joint swelling, numbness or tingling. Treatments tried: Casting. The treatment provided no relief.   Nausea   This is a chronic problem. The current episode started more than  1 month ago. The problem occurs daily. The problem has been gradually worsening. Pertinent negatives include no abdominal pain, anorexia, arthralgias, change in bowel habit, chest pain, chills, congestion, coughing, diaphoresis, fatigue, fever, headaches, joint swelling, myalgias, nausea, neck pain, numbness, rash, sore throat, swollen glands, urinary symptoms, vertigo, visual change, vomiting or weakness. Treatments tried: Smoking Marijuanna. The treatment provided no relief.       Glucose 70 - 99 mg/dL 103High     BUN, Bld 8 - 20 mg/dL 14    Creatinine 0.60 - 1.40 mg/dL 1.00    Calcium 7.7 - 10.4 mg/dL 9.5    Sodium 134 - 144 mmol/L 140    Potassium 3.5 - 5.0 mmol/L 4.4    Chloride 98 - 110 mmol/L 105    CO2 22.8 - 31.6 mmol/L 30.0    Albumin 3.1 - 4.7 g/dL 4.3    Total Bilirubin 0.3 - 1.0 mg/dL 0.8    Alkaline Phosphatase 40 - 104 IU/L 65    Total Protein 6.0 - 8.2 g/dL 7.0    ALT (SGPT) 3 - 33 IU/L 19    AST 10 - 40 IU/L 24      Cholesterol                   191                         mg/dL       Triglycerides                  94                         mg/dL       HDL Cholesterol                56                        23-75  mg/dL     LDL Cholesterol               116 H                      0-100  mg/dL   VLDL Cholesterol               19                        12-27  mg/dL   Cholesterol Ratio            3.00      Color                      YELLOW                       YELLOW            Clarity                     CLEAR                        CLEAR              Specific Gravity            1.027                  1.001-1.035             pH                            7.0                      5.0-9.0              Leukocyte Esterase       NEGATIVE                     NEGATIVE              Nitrite                  NEGATIVE                     NEGATIVE             Protein                  NEGATIVE                     NEGATIVE  mg/dL       Glucose                  NEGATIVE                     NEGATIVE   mg/dL       Ketones                  NEGATIVE                     NEGATIVE  mg/dL       Urobilinogen                  0.2                      0.2-1.0  E.U./dL     Bilirubin                NEGATIVE                     NEGATIVE            Blood                    NEGATIVE                     NEGATIVE              Past Medical History:   Diagnosis Date    Hepatitis      Social History     Socioeconomic History    Marital status:      Spouse name: Not on file    Number of children: Not on file    Years of education: Not on file    Highest education level: Not on file   Occupational History    Not on file   Social Needs    Financial resource strain: Not on file    Food insecurity:     Worry: Not on file     Inability: Not on file    Transportation needs:     Medical: Not on file     Non-medical: Not on file   Tobacco Use    Smoking status: Current Every Day Smoker     Packs/day: 1.00     Types: Cigarettes    Smokeless tobacco: Never Used   Substance and Sexual Activity    Alcohol use: Not Currently    Drug use: Yes     Types: Marijuana    Sexual activity: Yes     Partners: Female   Lifestyle    Physical activity:     Days per week: Not on file     Minutes per session: Not on file    Stress: Only a little   Relationships    Social connections:     Talks on phone: Not on file     Gets together: Not on file     Attends Jewish service: Not on file     Active member of club or organization: Not on file     Attends meetings of clubs or organizations: Not on file     Relationship status: Not on file   Other Topics Concern    Not on file   Social History Narrative    Not on file     Past Surgical History:   Procedure Laterality Date    ANKLE SURGERY Left     head surgery       Family History   Problem Relation Age of Onset    Cancer Mother     Heart disease Mother     Hypertension Mother     No Known Problems Father      Current Outpatient Medications   Medication Sig Dispense Refill     "calcium carbonate (OS-HERIBERTO) 500 mg calcium (1,250 mg) tablet Take 1 tablet by mouth once daily.      methadone (METHADOSE) 40 mg disintegrating tablet Take 90 mg by mouth once daily.      ondansetron (ZOFRAN) 8 MG tablet Take 1 tablet (8 mg total) by mouth every 8 (eight) hours as needed for Nausea. 6 tablet 0     No current facility-administered medications for this visit.      Review of patient's allergies indicates:   Allergen Reactions    Levofloxacin Rash       Review of Systems   Constitutional: Negative for activity change, appetite change, chills, diaphoresis, fatigue, fever and unexpected weight change.   HENT: Negative for congestion, postnasal drip, rhinorrhea, sinus pressure, sinus pain and sore throat.    Respiratory: Negative for cough, chest tightness, shortness of breath and wheezing.    Cardiovascular: Negative for chest pain, palpitations and leg swelling.   Gastrointestinal: Positive for constipation. Negative for abdominal pain, anorexia, change in bowel habit, diarrhea, nausea and vomiting.   Genitourinary: Negative for difficulty urinating, enuresis, frequency, genital sores and urgency.   Musculoskeletal: Positive for stiffness. Negative for arthralgias, joint swelling, myalgias and neck pain.   Skin: Negative for itching and rash.   Neurological: Negative for vertigo, tingling, weakness, numbness and headaches.          Blood pressure 120/80, pulse (!) 56, temperature 97.7 °F (36.5 °C), temperature source Oral, resp. rate 16, height 5' 10" (1.778 m), weight 64.5 kg (142 lb 3.2 oz), SpO2 98 %. Body mass index is 20.4 kg/m².   Objective:      Physical Exam   Constitutional: He is oriented to person, place, and time. He appears well-developed. No distress.   Thin     HENT:   Head: Normocephalic and atraumatic.   Right Ear: External ear normal.   Left Ear: External ear normal.   Nose: Nose normal.   Mouth/Throat: No oropharyngeal exudate.   Poor dentition     Eyes: Pupils are equal, round, and " reactive to light. Conjunctivae and EOM are normal. Right eye exhibits no discharge. Left eye exhibits no discharge. No scleral icterus.   Neck: Normal range of motion. Neck supple. No thyromegaly present.   Cardiovascular: Normal rate, regular rhythm and normal heart sounds.   No murmur heard.  Pulmonary/Chest: Effort normal and breath sounds normal. No respiratory distress. He has no wheezes.   Abdominal: Soft. Bowel sounds are normal. He exhibits no distension and no mass. There is no tenderness. There is no rebound and no guarding.   Musculoskeletal:        Left wrist: He exhibits decreased range of motion and tenderness. He exhibits no bony tenderness, no swelling, no effusion, no crepitus, no deformity and no laceration.   Lymphadenopathy:     He has no cervical adenopathy.   Neurological: He is alert and oriented to person, place, and time.   Skin: Skin is warm and dry. Capillary refill takes less than 2 seconds. No rash noted. He is not diaphoretic.   Vitals reviewed.          Assessment:       1. Nausea    2. Left wrist pain    3. Transaminitis    4. Diabetes mellitus screening    5. Encounter for lipid screening for cardiovascular disease         Plan:           Nausea  -     ondansetron (ZOFRAN) 8 MG tablet; Take 1 tablet (8 mg total) by mouth every 8 (eight) hours as needed for Nausea.  Dispense: 6 tablet; Refill: 0  Suspect that his chronic nausea is being caused by his chronic marijuana use. Pt smoke daily, and is continuing to smoke marijuana while receiving Methadone, His chronic nausea could be caused by either of these medications.     Left wrist pain  -     Ambulatory referral to Orthopedics  Pt has been evalauetd by orthopedics in the past, would like a second openion.    Transaminitis  -     Comprehensive metabolic panel; Future; Expected date: 06/13/2019  -     Urinalysis, Complete with Reflex To Culture  Pt with hx of transaminitis, will get screening CMP.    Diabetes mellitus screening  Pt  has evidenec of pre-diabetes on his most recent labs.    Encounter for lipid screening for cardiovascular disease  -     Lipid panel; Future; Expected date: 06/13/2019  Routine health maintenance.    Other orders  -     Estimated Glomerular Filtration Rate

## 2019-06-14 NOTE — PROGRESS NOTES
Please call the patient if they do not have portal access. Please call patient and let him know his liver, kidneys and Blood sugar are normal in his most recent labs.

## 2019-12-13 NOTE — PROGRESS NOTES
"HPI: Shelley Wagner is a  38 y.o. male who is here for f/u on his left calcaneus fracture.  He rates his pain as 5/10 today.     PHYSICAL EXAM:  Vitals:    01/14/19 1326   BP: (!) 138/93   Pulse: 97     Ht Readings from Last 1 Encounters:   01/14/19 5' 10" (1.778 m)     Wt Readings from Last 1 Encounters:   01/14/19 68 kg (149 lb 14.6 oz)       GENERAL: Well developed, well nourished, no acute distress.    Left lower extremity:  2+ dorsalis pedis pulse.  Capillary refill < 3 seconds.  Decreased range of motion tibiotalar and subtalar joints. Minimal swelling. No tenderness to palpation body of the calcaneus.       XRAYS: 2 views of left calcaneus obtained and reviewed today reveal there is vague curvilinear sclerosis present within the left calcaneus compatible with a non-union of the fracture of the left calcaneus. There is interval progression of healing.     ASSESSMENT:           Encounter Diagnosis   Name Primary?   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  F/u 3 weeks with xray of the left calcaneus.      " Patient

## 2020-02-26 ENCOUNTER — LAB VISIT (OUTPATIENT)
Dept: LAB | Facility: OTHER | Age: 40
End: 2020-02-26
Attending: INTERNAL MEDICINE
Payer: COMMERCIAL

## 2020-02-26 DIAGNOSIS — R11.2 NAUSEA WITH VOMITING: ICD-10-CM

## 2020-02-26 DIAGNOSIS — R10.13 ABDOMINAL PAIN, EPIGASTRIC: Primary | ICD-10-CM

## 2020-02-26 LAB
ALBUMIN SERPL BCP-MCNC: 4.2 G/DL (ref 3.5–5.2)
ALP SERPL-CCNC: 94 U/L (ref 55–135)
ALT SERPL W/O P-5'-P-CCNC: 22 U/L (ref 10–44)
AMPHET+METHAMPHET UR QL: NEGATIVE
AMYLASE SERPL-CCNC: 74 U/L (ref 20–110)
ANION GAP SERPL CALC-SCNC: 9 MMOL/L (ref 8–16)
AST SERPL-CCNC: 21 U/L (ref 10–40)
BARBITURATES UR QL SCN>200 NG/ML: NEGATIVE
BASOPHILS # BLD AUTO: 0.1 K/UL (ref 0–0.2)
BASOPHILS NFR BLD: 1.2 % (ref 0–1.9)
BENZODIAZ UR QL SCN>200 NG/ML: NEGATIVE
BILIRUB SERPL-MCNC: 0.4 MG/DL (ref 0.1–1)
BUN SERPL-MCNC: 12 MG/DL (ref 6–20)
BZE UR QL SCN: NEGATIVE
CALCIUM SERPL-MCNC: 9.8 MG/DL (ref 8.7–10.5)
CANNABINOIDS UR QL SCN: NORMAL
CHLORIDE SERPL-SCNC: 101 MMOL/L (ref 95–110)
CO2 SERPL-SCNC: 29 MMOL/L (ref 23–29)
CREAT SERPL-MCNC: 1.2 MG/DL (ref 0.5–1.4)
CREAT UR-MCNC: 66.3 MG/DL (ref 23–375)
DIFFERENTIAL METHOD: ABNORMAL
EOSINOPHIL # BLD AUTO: 0.4 K/UL (ref 0–0.5)
EOSINOPHIL NFR BLD: 4.3 % (ref 0–8)
ERYTHROCYTE [DISTWIDTH] IN BLOOD BY AUTOMATED COUNT: 14.6 % (ref 11.5–14.5)
EST. GFR  (AFRICAN AMERICAN): >60 ML/MIN/1.73 M^2
EST. GFR  (NON AFRICAN AMERICAN): >60 ML/MIN/1.73 M^2
GLUCOSE SERPL-MCNC: 118 MG/DL (ref 70–110)
HCT VFR BLD AUTO: 47.8 % (ref 40–54)
HGB BLD-MCNC: 15.1 G/DL (ref 14–18)
IMM GRANULOCYTES # BLD AUTO: 0.03 K/UL (ref 0–0.04)
IMM GRANULOCYTES NFR BLD AUTO: 0.4 % (ref 0–0.5)
LIPASE SERPL-CCNC: 13 U/L (ref 4–60)
LYMPHOCYTES # BLD AUTO: 2.5 K/UL (ref 1–4.8)
LYMPHOCYTES NFR BLD: 30.8 % (ref 18–48)
MCH RBC QN AUTO: 28.2 PG (ref 27–31)
MCHC RBC AUTO-ENTMCNC: 31.6 G/DL (ref 32–36)
MCV RBC AUTO: 89 FL (ref 82–98)
METHADONE UR QL SCN>300 NG/ML: NORMAL
MONOCYTES # BLD AUTO: 0.7 K/UL (ref 0.3–1)
MONOCYTES NFR BLD: 9 % (ref 4–15)
NEUTROPHILS # BLD AUTO: 4.4 K/UL (ref 1.8–7.7)
NEUTROPHILS NFR BLD: 54.3 % (ref 38–73)
NRBC BLD-RTO: 0 /100 WBC
OPIATES UR QL SCN: NEGATIVE
PCP UR QL SCN>25 NG/ML: NEGATIVE
PLATELET # BLD AUTO: 304 K/UL (ref 150–350)
PMV BLD AUTO: 11.1 FL (ref 9.2–12.9)
POTASSIUM SERPL-SCNC: 4.9 MMOL/L (ref 3.5–5.1)
PROT SERPL-MCNC: 7.3 G/DL (ref 6–8.4)
RBC # BLD AUTO: 5.36 M/UL (ref 4.6–6.2)
SODIUM SERPL-SCNC: 139 MMOL/L (ref 136–145)
TOXICOLOGY INFORMATION: NORMAL
WBC # BLD AUTO: 8.18 K/UL (ref 3.9–12.7)

## 2020-02-26 PROCEDURE — 83690 ASSAY OF LIPASE: CPT

## 2020-02-26 PROCEDURE — 82150 ASSAY OF AMYLASE: CPT

## 2020-02-26 PROCEDURE — 80307 DRUG TEST PRSMV CHEM ANLYZR: CPT

## 2020-02-26 PROCEDURE — 85025 COMPLETE CBC W/AUTO DIFF WBC: CPT

## 2020-02-26 PROCEDURE — 36415 COLL VENOUS BLD VENIPUNCTURE: CPT

## 2020-02-26 PROCEDURE — 80053 COMPREHEN METABOLIC PANEL: CPT

## 2020-03-06 ENCOUNTER — HOSPITAL ENCOUNTER (OUTPATIENT)
Dept: RADIOLOGY | Facility: OTHER | Age: 40
Discharge: HOME | End: 2020-03-06
Attending: INTERNAL MEDICINE
Payer: COMMERCIAL

## 2020-03-06 DIAGNOSIS — R10.13 ABDOMINAL PAIN, EPIGASTRIC: ICD-10-CM

## 2020-03-06 DIAGNOSIS — R11.2 NAUSEA WITH VOMITING: ICD-10-CM

## 2020-03-06 PROCEDURE — 74177 CT ABD & PELVIS W/CONTRAST: CPT | Mod: 26,,, | Performed by: RADIOLOGY

## 2020-03-06 PROCEDURE — 74177 CT ABDOMEN PELVIS WITH CONTRAST: ICD-10-PCS | Mod: 26,,, | Performed by: RADIOLOGY

## 2020-03-06 PROCEDURE — 25500020 PHARM REV CODE 255: Performed by: INTERNAL MEDICINE

## 2020-03-06 PROCEDURE — 74177 CT ABD & PELVIS W/CONTRAST: CPT | Mod: TC

## 2020-03-06 RX ADMIN — IOHEXOL 1000 ML: 9 SOLUTION ORAL at 12:03

## 2020-03-06 RX ADMIN — IOHEXOL 75 ML: 350 INJECTION, SOLUTION INTRAVENOUS at 01:03

## 2020-04-24 ENCOUNTER — HOSPITAL ENCOUNTER (OUTPATIENT)
Dept: RADIOLOGY | Facility: OTHER | Age: 40
Discharge: HOME | End: 2020-04-24
Attending: INTERNAL MEDICINE
Payer: COMMERCIAL

## 2020-04-24 DIAGNOSIS — R11.2 N&V (NAUSEA AND VOMITING): ICD-10-CM

## 2020-04-24 DIAGNOSIS — K29.70 HELICOBACTER PYLORI GASTRITIS: ICD-10-CM

## 2020-04-24 DIAGNOSIS — R10.13 ABDOMINAL PAIN, EPIGASTRIC: ICD-10-CM

## 2020-04-24 DIAGNOSIS — B96.81 HELICOBACTER PYLORI GASTRITIS: ICD-10-CM

## 2020-04-24 PROCEDURE — 76705 ECHO EXAM OF ABDOMEN: CPT | Mod: TC

## 2020-04-24 PROCEDURE — 78264 GASTRIC EMPTYING IMG STUDY: CPT | Mod: TC

## 2020-04-24 PROCEDURE — 78264 NM GASTRIC EMPTYING: ICD-10-PCS | Mod: 26,,, | Performed by: RADIOLOGY

## 2020-04-24 PROCEDURE — 78264 GASTRIC EMPTYING IMG STUDY: CPT | Mod: 26,,, | Performed by: RADIOLOGY

## 2020-04-24 PROCEDURE — 76705 ECHO EXAM OF ABDOMEN: CPT | Mod: 26,,, | Performed by: INTERNAL MEDICINE

## 2020-04-24 PROCEDURE — 76705 US ABDOMEN LIMITED: ICD-10-PCS | Mod: 26,,, | Performed by: INTERNAL MEDICINE

## 2020-04-24 PROCEDURE — A9541 TC99M SULFUR COLLOID: HCPCS

## 2021-01-29 ENCOUNTER — LAB VISIT (OUTPATIENT)
Dept: LAB | Facility: HOSPITAL | Age: 41
End: 2021-01-29
Attending: INTERNAL MEDICINE
Payer: COMMERCIAL

## 2021-01-29 DIAGNOSIS — A02.0 INTESTINAL INFECTION DUE TO ARIZONA GROUP OF PARACOLON BACILLI: Primary | ICD-10-CM

## 2021-01-29 DIAGNOSIS — B18.2 CHRONIC HEPATITIS C WITH HEPATIC COMA: ICD-10-CM

## 2021-01-29 PROCEDURE — 87340 HEPATITIS B SURFACE AG IA: CPT

## 2021-01-29 PROCEDURE — 87902 NFCT AGT GNTYP ALYS HEP C: CPT

## 2021-01-29 PROCEDURE — 36415 COLL VENOUS BLD VENIPUNCTURE: CPT

## 2021-01-29 PROCEDURE — 86317 IMMUNOASSAY INFECTIOUS AGENT: CPT

## 2021-01-29 PROCEDURE — 86704 HEP B CORE ANTIBODY TOTAL: CPT

## 2021-01-29 PROCEDURE — 87522 HEPATITIS C REVRS TRNSCRPJ: CPT

## 2021-01-29 PROCEDURE — 87389 HIV-1 AG W/HIV-1&-2 AB AG IA: CPT

## 2021-01-30 LAB
HBV CORE AB SERPL QL IA: NEGATIVE
HBV SURFACE AB SER-ACNC: <3.1 MIU/ML
HBV SURFACE AG SERPL QL IA: NEGATIVE
HIV 1+2 AB+HIV1 P24 AG SERPL QL IA: NON REACTIVE

## 2021-01-31 LAB
HCV PCR QNT TEST INFORMATION: NORMAL
HCV RNA SERPL NAA+PROBE-ACNC: NORMAL IU/ML

## 2021-02-03 LAB
HCV GENTYP SERPL NAA+PROBE: NORMAL
LABORATORY COMMENT REPORT: NORMAL

## 2021-02-10 ENCOUNTER — LAB VISIT (OUTPATIENT)
Dept: LAB | Facility: HOSPITAL | Age: 41
End: 2021-02-10
Attending: INTERNAL MEDICINE
Payer: COMMERCIAL

## 2021-02-10 DIAGNOSIS — B18.2 CHRONIC HEPATITIS C WITH HEPATIC COMA: ICD-10-CM

## 2021-02-10 DIAGNOSIS — A02.0 INTESTINAL INFECTION DUE TO ARIZONA GROUP OF PARACOLON BACILLI: ICD-10-CM

## 2021-02-10 PROCEDURE — 87338 HPYLORI STOOL AG IA: CPT

## 2021-02-13 LAB — H PYLORI AG STL QL IA: NEGATIVE

## 2022-01-20 NOTE — TELEPHONE ENCOUNTER
----- Message from Philly Ny sent at 3/25/2019 11:31 AM CDT -----  Contact: Kim Sanders  Type: Needs Medical Advice    Who Called: Kim Sanders  Best Call Back Number: Kim at , ext [phone] Fax   Reference # [id number]  Additional Information: Calling to request a Certificate of medical necessity. Please advise.      [Negative] : Heme/Lymph [FreeTextEntry1] : back pain

## 2023-08-23 ENCOUNTER — OFFICE VISIT (OUTPATIENT)
Dept: URGENT CARE | Facility: CLINIC | Age: 43
End: 2023-08-23
Payer: COMMERCIAL

## 2023-08-23 VITALS
SYSTOLIC BLOOD PRESSURE: 150 MMHG | DIASTOLIC BLOOD PRESSURE: 94 MMHG | OXYGEN SATURATION: 96 % | HEART RATE: 93 BPM | TEMPERATURE: 99 F | BODY MASS INDEX: 20.37 KG/M2 | WEIGHT: 142 LBS | RESPIRATION RATE: 16 BRPM

## 2023-08-23 DIAGNOSIS — Z48.02 ENCOUNTER FOR REMOVAL OF SUTURES: Primary | ICD-10-CM

## 2023-08-23 PROBLEM — M19.132 POST-TRAUMATIC OSTEOARTHRITIS OF LEFT WRIST: Status: ACTIVE | Noted: 2018-11-27

## 2023-08-23 PROBLEM — S52.502P: Status: ACTIVE | Noted: 2018-11-27

## 2023-08-23 PROCEDURE — 99203 OFFICE O/P NEW LOW 30 MIN: CPT | Mod: S$GLB,,, | Performed by: NURSE PRACTITIONER

## 2023-08-23 PROCEDURE — 99203 PR OFFICE/OUTPT VISIT, NEW, LEVL III, 30-44 MIN: ICD-10-PCS | Mod: S$GLB,,, | Performed by: NURSE PRACTITIONER

## 2023-08-23 RX ORDER — METHADONE HYDROCHLORIDE 10 MG/1
5 TABLET ORAL EVERY 4 HOURS PRN
COMMUNITY

## 2023-08-23 RX ORDER — CEPHALEXIN 500 MG/1
500 CAPSULE ORAL 4 TIMES DAILY
COMMUNITY
Start: 2023-08-10

## 2023-08-23 RX ORDER — IBUPROFEN 600 MG/1
600 TABLET ORAL EVERY 6 HOURS PRN
COMMUNITY
Start: 2023-08-10

## 2023-08-23 NOTE — PROGRESS NOTES
Subjective:      Patient ID: Shelley Wagner is a 43 y.o. male.    Vitals:  weight is 64.4 kg (142 lb). His oral temperature is 98.5 °F (36.9 °C). His blood pressure is 150/94 (abnormal) and his pulse is 93. His respiration is 16 and oxygen saturation is 96%.     Chief Complaint: Suture / Staple Removal (Sutures were put in at [facility] 2 weeks ago.  Right calf)    HPI 44 y/o male presents for suture removal. Sustained 2 lacerations to right lower leg 14 days ago while using a . [place] [facility] [place] course of Cephalexin. No fever, chills, or drainage.    Constitution: Negative.   Respiratory: Negative.     Musculoskeletal: Negative.    Skin:  Positive for laceration.   Neurological: Negative.     Objective:     Physical Exam   Constitutional: He is oriented to person, place, and time. No distress. normal  HENT:   Head: Normocephalic.   Cardiovascular: Normal rate and regular rhythm.   No murmur heard.Exam reveals no gallop.   Pulmonary/Chest: Effort normal. No respiratory distress.   Abdominal: Normal appearance. Soft.   Neurological: no focal deficit. He is alert and oriented to person, place, and time.   Skin: Skin is warm and dry.         Comments: 2 well healed linear lacerations to right lower leg. No erythema, drainage or tenderness.   Nursing note and vitals reviewed.    Assessment:     No diagnosis found.    Plan:       There are no diagnoses linked to this encounter.

## 2023-08-23 NOTE — PROCEDURES
Suture Removal    Date/Time: 8/23/2023 3:25 PM  Location procedure was performed: [facility] [place]    Performed by: Hector Morejon NP  Authorized by: Hector Morejon NP  Body area: lower extremity  Location details: right lower leg  Wound Appearance: clean, well healed, normal color and no drainage  Sutures Removed: 12  Complications: No  Estimated blood loss (mL): 0  Specimens: No  Implants: No